# Patient Record
Sex: MALE | URBAN - METROPOLITAN AREA
[De-identification: names, ages, dates, MRNs, and addresses within clinical notes are randomized per-mention and may not be internally consistent; named-entity substitution may affect disease eponyms.]

---

## 2022-01-19 ENCOUNTER — MEDICAL CORRESPONDENCE (OUTPATIENT)
Dept: HEALTH INFORMATION MANAGEMENT | Facility: CLINIC | Age: 57
End: 2022-01-19
Payer: COMMERCIAL

## 2022-01-25 ENCOUNTER — TRANSCRIBE ORDERS (OUTPATIENT)
Dept: OTHER | Age: 57
End: 2022-01-25
Payer: COMMERCIAL

## 2022-01-25 DIAGNOSIS — M89.8X9 EXOSTOSIS: Primary | ICD-10-CM

## 2022-01-25 DIAGNOSIS — M79.661 RIGHT CALF PAIN: ICD-10-CM

## 2022-01-26 ENCOUNTER — TELEPHONE (OUTPATIENT)
Dept: ORTHOPEDICS | Facility: CLINIC | Age: 57
End: 2022-01-26
Payer: COMMERCIAL

## 2022-01-26 NOTE — TELEPHONE ENCOUNTER
Called patient and scheduled for new tumor consult with Dr. Royal. Patient requested to have a phone encounter for first visit as he lives over 2 hours away. Imaging is being mailed to us to be uploaded. Patient had no further questions.     Joshua Rg, EMT on 1/26/2022 at 11:59 AM

## 2022-01-28 NOTE — TELEPHONE ENCOUNTER
RECORDS RECEIVED FROM: NEW TUMOR // Exostosis/ Right Calf Pain from Dr. Shawn Allen DO at [facility], telephone visit [phone]    DATE RECEIVED: Feb 9, 2022     NOTES STATUS DETAILS   OFFICE NOTE from referring provider Care Everywhere Shawn Allen DO     MEDICATION LIST Internal    MRI Received    US Received      01/28/22   10:49 AM   FAXED REQUEST TO [facility] FOR IMAGES  Fatou Story CMA

## 2022-02-08 NOTE — PROGRESS NOTES
[facility] Phys[facility], Orthopaedic Oncology Surgery Consultation    Dean Melendez MRN# [id number]   Age: 57 year old YOB: 1965     Requesting physician: No ref. provider found  No primary care provider on file.            Assessment and Plan:   Assessment:  Right knee patellofemoral arthritis and a right proximal tibia exostosis.     Plan:  This patient will come in for an in person visit to see if he is symptomatic over his exostosis.           History of Present Illness:   Dean Melendez is a 57-year-old gentleman who is a patient of Dr. lAlen's in [place], [place].  Dr. Allen has been following patient for his right knee.  Patient has previously undergone a right knee arthroscopy, partial medial/lateral meniscectomy, medial meniscal root repair, and abrasion chondroplasty on 11/18/2021.  Patient noted improvement in his presurgical symptoms however, reported persistent achiness and soreness. Patient has tried steroid injections without significant relief.      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     I did a 10-minute phone visit with this patient.      Over the phone he was alert oriented had a normal mood and affect and was in no acute distress.  He asked many good questions.  He is complaining of knee pain that sometimes affects the lower leg and hamstrings but is chiefly in the knee.  He notices anterior knee pain especially when doing stairs.  He spent 25 years as a  and still has discomfort when he goes out to do any onsite work.    Reviewed the patient's imaging studies which show a benign exostosis in the proximal medial aspect of the right tibia.  The patient also has a full-thickness cartilage defect in the patellofemoral joint with some lateralization of the patella.  He has some other milder degenerative changes within the rest of the knee.    It is difficult  for me to tell if this patient has any symptoms from his exostosis.  I think most of his symptoms are coming from his severe patellofemoral arthritis.  He will come in so that I can examine him and will hopefully have a better sense of any symptoms from the exostosis.  If he has focal symptoms at the site of his exostosis then I would likely recommend surgical excision.  If not, I will have him focus on his knee arthritis treatment from his referring providers.

## 2022-02-09 ENCOUNTER — VIRTUAL VISIT (OUTPATIENT)
Dept: ORTHOPEDICS | Facility: CLINIC | Age: 57
End: 2022-02-09
Payer: COMMERCIAL

## 2022-02-09 ENCOUNTER — PRE VISIT (OUTPATIENT)
Dept: ORTHOPEDICS | Facility: CLINIC | Age: 57
End: 2022-02-09
Payer: COMMERCIAL

## 2022-02-09 DIAGNOSIS — M79.661 RIGHT CALF PAIN: ICD-10-CM

## 2022-02-09 DIAGNOSIS — M89.8X9 EXOSTOSIS: ICD-10-CM

## 2022-02-09 PROCEDURE — 99203 OFFICE O/P NEW LOW 30 MIN: CPT | Mod: 95 | Performed by: ORTHOPAEDIC SURGERY

## 2022-02-09 NOTE — LETTER
2/9/2022         RE: Dean Melendez  [address] [facility] [address]        Dear Colleague,    Thank you for referring your patient, Dean Melendez, to the [facility] [place]. Please see a copy of my visit note below.        [facility] Physicians, Orthopaedic Oncology Surgery Consultation    Dean Melendez MRN# [id number]   Age: 57 year old YOB: 1965     Requesting physician: No ref. provider found  No primary care provider on file.            Assessment and Plan:   Assessment:  Right knee patellofemoral arthritis and a right proximal tibia exostosis.     Plan:  This patient will come in for an in person visit to see if he is symptomatic over his exostosis.           History of Present Illness:   Dean Melendez is a 57-year-old gentleman who is a patient of Dr. Allen's in [place], [place].  Dr. Allen has been following patient for his right knee.  Patient has previously undergone a right knee arthroscopy, partial medial/lateral meniscectomy, medial meniscal root repair, and abrasion chondroplasty on 11/18/2021.  Patient noted improvement in his presurgical symptoms however, reported persistent achiness and soreness. Patient has tried steroid injections without significant relief.      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     I did a 10-minute phone visit with this patient.      Over the phone he was alert oriented had a normal mood and affect and was in no acute distress.  He asked many good questions.  He is complaining of knee pain that sometimes affects the lower leg and hamstrings but is chiefly in the knee.  He notices anterior knee pain especially when doing stairs.  He spent 25 years as a  and still has discomfort when he goes out to do any onsite work.    Reviewed the patient's imaging studies which show a benign exostosis in the  proximal medial aspect of the right tibia.  The patient also has a full-thickness cartilage defect in the patellofemoral joint with some lateralization of the patella.  He has some other milder degenerative changes within the rest of the knee.    It is difficult for me to tell if this patient has any symptoms from his exostosis.  I think most of his symptoms are coming from his severe patellofemoral arthritis.  He will come in so that I can examine him and will hopefully have a better sense of any symptoms from the exostosis.  If he has focal symptoms at the site of his exostosis then I would likely recommend surgical excision.  If not, I will have him focus on his knee arthritis treatment from his referring providers.          Again, thank you for allowing me to participate in the care of your patient.        Sincerely,        Ayush Royal MD

## 2022-02-09 NOTE — NURSING NOTE
Reason For Visit:   Chief Complaint   Patient presents with     Consult     right tibial osteochondroma referred by Dr. Shawn Allen at [facility]    -pt reported that he had meniscal surgery 1 year ago    There were no vitals taken for this visit.    Pain Assessment  Patient Currently in Pain: Yes  0-10 Pain Scale: 4  Primary Pain Location: Knee (below right knee and posterior)        -KHANG Edwards- Orthopedics

## 2022-02-09 NOTE — LETTER
Date:February 11, 2022      Patient was self referred, no letter generated. Do not send.        [facility] Health Information

## 2022-02-09 NOTE — LETTER
2/9/2022         RE: Dean Melendez  [address] [facility] [address]          [facility], Orthopaedic Oncology Surgery Consultation    Dean Melendez MRN# [id number]   Age: 57 year old YOB: 1965     Requesting physician: No ref. provider found  No primary care provider on file.            Assessment and Plan:   Assessment:  Right knee patellofemoral arthritis and a right proximal tibia exostosis.     Plan:  This patient will come in for an in person visit to see if he is symptomatic over his exostosis.           History of Present Illness:   Dean Melendez is a 57-year-old gentleman who is a patient of Dr. Allen's in [place], [place].  Dr. Allen has been following patient for his right knee.  Patient has previously undergone a right knee arthroscopy, partial medial/lateral meniscectomy, medial meniscal root repair, and abrasion chondroplasty on 11/18/2021.  Patient noted improvement in his presurgical symptoms however, reported persistent achiness and soreness. Patient has tried steroid injections without significant relief.      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     I did a 10-minute phone visit with this patient.      Over the phone he was alert oriented had a normal mood and affect and was in no acute distress.  He asked many good questions.  He is complaining of knee pain that sometimes affects the lower leg and hamstrings but is chiefly in the knee.  He notices anterior knee pain especially when doing stairs.  He spent 25 years as a  and still has discomfort when he goes out to do any onsite work.    Reviewed the patient's imaging studies which show a benign exostosis in the proximal medial aspect of the right tibia.  The patient also has a full-thickness cartilage defect in the patellofemoral joint with some lateralization of the patella.  He has some  other milder degenerative changes within the rest of the knee.    It is difficult for me to tell if this patient has any symptoms from his exostosis.  I think most of his symptoms are coming from his severe patellofemoral arthritis.  He will come in so that I can examine him and will hopefully have a better sense of any symptoms from the exostosis.  If he has focal symptoms at the site of his exostosis then I would likely recommend surgical excision.  If not, I will have him focus on his knee arthritis treatment from his referring providers.        Ayush Royal MD

## 2022-02-18 ENCOUNTER — OFFICE VISIT (OUTPATIENT)
Dept: ORTHOPEDICS | Facility: CLINIC | Age: 57
End: 2022-02-18
Payer: COMMERCIAL

## 2022-02-18 VITALS — WEIGHT: 315 LBS | HEIGHT: 71 IN | BODY MASS INDEX: 44.1 KG/M2

## 2022-02-18 DIAGNOSIS — S76.311A STRAIN OF RIGHT HAMSTRING MUSCLE, INITIAL ENCOUNTER: ICD-10-CM

## 2022-02-18 DIAGNOSIS — E66.01 MORBID OBESITY (H): ICD-10-CM

## 2022-02-18 DIAGNOSIS — M89.8X9 EXOSTOSIS: ICD-10-CM

## 2022-02-18 DIAGNOSIS — M22.2X1 PATELLOFEMORAL SYNDROME OF RIGHT KNEE: Primary | ICD-10-CM

## 2022-02-18 PROCEDURE — 99213 OFFICE O/P EST LOW 20 MIN: CPT | Mod: GC | Performed by: ORTHOPAEDIC SURGERY

## 2022-02-18 NOTE — NURSING NOTE
"Reason For Visit:   Chief Complaint   Patient presents with     RECHECK     in-clinic eval right knee and calf pain        Ht 1.8 m (5' 10.87\")   Wt 144.2 kg (318 lb)   BMI 44.52 kg/m      Pain Assessment  Patient Currently in Pain: Yes  0-10 Pain Scale: 2  Primary Pain Location: Knee (right knee)          -KHANG Edwards- Orthopedics      "

## 2022-02-18 NOTE — LETTER
2/18/2022         RE: Dean Melendez  [address] [facility] [address]        Dear Colleague,    Thank you for referring your patient, Dean Melendez, to the [facility] [place]. Please see a copy of my visit note below.    Orthopaedic Surgery Clinic Note 02/18/2022    S: Dean Melendez is a 57-year-old male who was evaluated virtually in orthopedic clinic last week on 2/9/2022 where he was diagnosed with right knee patellofemoral arthritis, and right proximal tibia exostosis.  During his virtual evaluation, discussed with patient that it was difficult to discern whether his symptoms were secondary to to his exostoses, and thereby recommended that he present to clinic for further evaluation.    Patient presents today, and reports that his chronic right knee pain is predominantly focused along the lateral posterior aspect of his thigh.  He has reproducible tenderness to palpation along the posterior lateral aspect of his thigh that he reports is the origin of his symptoms.  When his symptoms are exacerbated, he does acknowledge that his pain extends distally.  He reports that his posterior lateral thigh pain is exacerbated with daily utilization in particular, when performing transitional movements.    He reports that his baseline arthritic symptoms, are manageable, and are distinct from this current discomfort.  He reports that his knee pain is exacerbated with kneeling, and stairs. He denies associated neurovascular changes.  He is not currently taking any medications related to his symptoms.    Prior Interventions:  11/18/2021-right knee arthroscopy, partial medial/lateral meniscectomy, medial meniscal repair, and abrasion chondroplasty  2 prior steroid intra-articular injections without relief a little bit of    Due to his persistent symptoms, patient presents to clinic today with his wife to discuss the underlying etiology, and associated treatment  options.    O:  Exam:  Gen: No acute distress, resting comfortably in bed.  CV: wwp  Resp: Non-labored breathing  MSK:  Right lower Extremity:    Inspection: Notable for bilateral varicose veins  Reproducible tenderness to palpation along the posterior lateral aspect of patient's thigh in association with palpable varicose veins.  Motor:  strength 5/5 with hip flexion, quad, hamstring, tibialis anterior, gastroc/soleus, FHL, EHL  Knee ROM =0 to 100 degrees flexion  Stable to varus/valgus stress testing, and anterior/posterior drawer stress testing  Sensory: SILT to superficial peroneal, deep peroneal, saphenous, sural, and tibial nerve distributions  Circulation: foot warm and well perfused    Assessment: Dean Melendez is a 57 year old male with the following diagnoses:  1.  Right knee patellofemoral arthritis  2.  Right proximal tibial exostosis    Plan:  Discussed with wife, and patient that his right proximal tibial exostosis does not appear to be related to his current symptoms given that his current symptoms are predominantly lateral, and originate in the thigh while his exostosis is medial and originating from his proximal tibia.  Emphasized that his exostosis has been present for an extended period of time, and do not recommend intervention for his tibial exostosis in the setting of no associated symptomatology.    Discussed that the reproducible tenderness to palpation along the posterior lateral aspect of his thigh could be secondary to his varicose veins.  Recommend that patient obtain thigh-high compression stockings.    In regards to his underlying patellofemoral arthritis, and the difficulty he describes stairs, recommend that patient participate in physical therapy.  A prescription was provided for physical therapy.  Patient plans to go to therapy near his home.    Patient can return to clinic as needed.  Encourage patient to return to clinic should his symptoms worsen, or he have persistent symptoms  despite the above interventions.    Patient and his wife acknowledged understanding of the above care plan; no additional questions or concerns at this point time.    This patient was discussed and evaluated with Dr. Royal who is in agreement with the above care plan.    Danuta Moseley MD  Orthopaedic Surgery, PGY-4          I was present with the resident during the history and exam.  I discussed the case with the resident and agree with the findings as documented in the assessment and plan.      Again, thank you for allowing me to participate in the care of your patient.        Sincerely,        Ayush Royal MD

## 2022-02-18 NOTE — PROGRESS NOTES
Orthopaedic Surgery Clinic Note 02/18/2022    S: Dean Melendez is a 57-year-old male who was evaluated virtually in orthopedic clinic last week on 2/9/2022 where he was diagnosed with right knee patellofemoral arthritis, and right proximal tibia exostosis.  During his virtual evaluation, discussed with patient that it was difficult to discern whether his symptoms were secondary to to his exostoses, and thereby recommended that he present to clinic for further evaluation.    Patient presents today, and reports that his chronic right knee pain is predominantly focused along the lateral posterior aspect of his thigh.  He has reproducible tenderness to palpation along the posterior lateral aspect of his thigh that he reports is the origin of his symptoms.  When his symptoms are exacerbated, he does acknowledge that his pain extends distally.  He reports that his posterior lateral thigh pain is exacerbated with daily utilization in particular, when performing transitional movements.    He reports that his baseline arthritic symptoms, are manageable, and are distinct from this current discomfort.  He reports that his knee pain is exacerbated with kneeling, and stairs. He denies associated neurovascular changes.  He is not currently taking any medications related to his symptoms.    Prior Interventions:  11/18/2021-right knee arthroscopy, partial medial/lateral meniscectomy, medial meniscal repair, and abrasion chondroplasty  2 prior steroid intra-articular injections without relief a little bit of    Due to his persistent symptoms, patient presents to clinic today with his wife to discuss the underlying etiology, and associated treatment options.    O:  Exam:  Gen: No acute distress, resting comfortably in bed.  CV: wwp  Resp: Non-labored breathing  MSK:  Right lower Extremity:    Inspection: Notable for bilateral varicose veins  Reproducible tenderness to palpation along the posterior lateral aspect of patient's thigh  in association with palpable varicose veins.  Motor:  strength 5/5 with hip flexion, quad, hamstring, tibialis anterior, gastroc/soleus, FHL, EHL  Knee ROM =0 to 100 degrees flexion  Stable to varus/valgus stress testing, and anterior/posterior drawer stress testing  Sensory: SILT to superficial peroneal, deep peroneal, saphenous, sural, and tibial nerve distributions  Circulation: foot warm and well perfused    Assessment: Dean Melendez is a 57 year old male with the following diagnoses:  1.  Right knee patellofemoral arthritis  2.  Right proximal tibial exostosis    Plan:  Discussed with wife, and patient that his right proximal tibial exostosis does not appear to be related to his current symptoms given that his current symptoms are predominantly lateral, and originate in the thigh while his exostosis is medial and originating from his proximal tibia.  Emphasized that his exostosis has been present for an extended period of time, and do not recommend intervention for his tibial exostosis in the setting of no associated symptomatology.    Discussed that the reproducible tenderness to palpation along the posterior lateral aspect of his thigh could be secondary to his varicose veins.  Recommend that patient obtain thigh-high compression stockings.    In regards to his underlying patellofemoral arthritis, and the difficulty he describes stairs, recommend that patient participate in physical therapy.  A prescription was provided for physical therapy.  Patient plans to go to therapy near his home.    Patient can return to clinic as needed.  Encourage patient to return to clinic should his symptoms worsen, or he have persistent symptoms despite the above interventions.    Patient and his wife acknowledged understanding of the above care plan; no additional questions or concerns at this point time.    This patient was discussed and evaluated with Dr. Royal who is in agreement with the above care plan.    Danuta  MD Pradip  Orthopaedic Surgery, PGY-4

## 2022-02-18 NOTE — LETTER
Date:February 18, 2022      Provider requested that no letter be sent. Do not send.       [facility]